# Patient Record
Sex: MALE | Race: BLACK OR AFRICAN AMERICAN | NOT HISPANIC OR LATINO | Employment: UNEMPLOYED | URBAN - METROPOLITAN AREA
[De-identification: names, ages, dates, MRNs, and addresses within clinical notes are randomized per-mention and may not be internally consistent; named-entity substitution may affect disease eponyms.]

---

## 2024-01-15 ENCOUNTER — OFFICE VISIT (OUTPATIENT)
Dept: URGENT CARE | Facility: URGENT CARE | Age: 34
End: 2024-01-15
Payer: COMMERCIAL

## 2024-01-15 VITALS
RESPIRATION RATE: 18 BRPM | HEART RATE: 71 BPM | SYSTOLIC BLOOD PRESSURE: 136 MMHG | WEIGHT: 187.39 LBS | DIASTOLIC BLOOD PRESSURE: 88 MMHG | TEMPERATURE: 98.3 F | OXYGEN SATURATION: 100 %

## 2024-01-15 DIAGNOSIS — M54.41 ACUTE RIGHT-SIDED LOW BACK PAIN WITH RIGHT-SIDED SCIATICA: Primary | ICD-10-CM

## 2024-01-15 PROCEDURE — 99203 OFFICE O/P NEW LOW 30 MIN: CPT | Performed by: FAMILY MEDICINE

## 2024-01-15 RX ORDER — NAPROXEN 500 MG/1
500 TABLET ORAL 2 TIMES DAILY WITH MEALS
Qty: 14 TABLET | Refills: 0 | Status: SHIPPED | OUTPATIENT
Start: 2024-01-15 | End: 2024-01-22

## 2024-01-15 RX ORDER — METHYLPREDNISOLONE 4 MG
TABLET, DOSE PACK ORAL
Qty: 21 TABLET | Refills: 0 | Status: SHIPPED | OUTPATIENT
Start: 2024-01-15

## 2024-01-15 RX ORDER — METHOCARBAMOL 750 MG/1
750 TABLET, FILM COATED ORAL 4 TIMES DAILY
Qty: 28 TABLET | Refills: 0 | Status: SHIPPED | OUTPATIENT
Start: 2024-01-15 | End: 2024-01-22

## 2024-01-15 NOTE — PROGRESS NOTES
SUBJECTIVEHPI: Zaria Solomon is a 33 year old male who presents for evaluation of back pain.  Symptoms began day(s) ago, have been onset gradual and are worse.  Pain is located in the low back right region, with radiation to radiates into the right leg.  Recent injury:none recalled by the patient  Personal hx of back pain is recurrent self limited episodes of low back pain in the past.  Pain is exacerbated by: bending and changing position.  Pain is relieved by: none.  Associated sx include: none.  Red flag symptoms: negative.    No past medical history on file.  No Known Allergies  Social History     Tobacco Use    Smoking status: Never    Smokeless tobacco: Never   Substance Use Topics    Alcohol use: Not Currently       ROS:CONSTITUTIONAL:NEGATIVE for fever, chills, change in weight  INTEGUMENTARY/SKIN: NEGATIVE for worrisome rashes, moles or lesions    OBJECTIVE:  /88   Pulse 71   Temp 98.3  F (36.8  C)   Resp 18   Wt 85 kg (187 lb 6.3 oz)   SpO2 100%   Back examination: Back symmetric, no curvature. ROM normal. No CVA tenderness., positive findings: limitation of motion - flexion: Moderate and extension: Moderate, paraspinal muscle spasm, tenderness to palpation.  Straight leg raise test: positive.  GENERAL APPEARANCE: healthy, alert and no distress  NEURO: Normal strength and tone with no weakness or sensory deficit noted, reflexes normal   SKIN: no suspicious lesions or rashes      ICD-10-CM    1. Acute right-sided low back pain with right-sided sciatica  M54.41 methylPREDNISolone (MEDROL DOSEPAK) 4 MG tablet therapy pack     methocarbamol (ROBAXIN) 750 MG tablet     naproxen (NAPROSYN) 500 MG tablet        Continue prn heat or ice application.    F/U PCP/IM/FP if persists, ED if worse

## 2024-10-23 ENCOUNTER — OFFICE VISIT (OUTPATIENT)
Dept: FAMILY MEDICINE | Facility: CLINIC | Age: 34
End: 2024-10-23
Payer: COMMERCIAL

## 2024-10-23 ENCOUNTER — ANCILLARY PROCEDURE (OUTPATIENT)
Dept: GENERAL RADIOLOGY | Facility: CLINIC | Age: 34
End: 2024-10-23
Attending: NURSE PRACTITIONER
Payer: COMMERCIAL

## 2024-10-23 VITALS
TEMPERATURE: 97.7 F | RESPIRATION RATE: 18 BRPM | SYSTOLIC BLOOD PRESSURE: 111 MMHG | HEIGHT: 68 IN | BODY MASS INDEX: 29.1 KG/M2 | HEART RATE: 69 BPM | OXYGEN SATURATION: 99 % | WEIGHT: 192 LBS | DIASTOLIC BLOOD PRESSURE: 73 MMHG

## 2024-10-23 DIAGNOSIS — M54.42 CHRONIC LEFT-SIDED LOW BACK PAIN WITH LEFT-SIDED SCIATICA: ICD-10-CM

## 2024-10-23 DIAGNOSIS — G89.29 CHRONIC LEFT-SIDED LOW BACK PAIN WITH LEFT-SIDED SCIATICA: Primary | ICD-10-CM

## 2024-10-23 DIAGNOSIS — M54.42 CHRONIC LEFT-SIDED LOW BACK PAIN WITH LEFT-SIDED SCIATICA: Primary | ICD-10-CM

## 2024-10-23 DIAGNOSIS — G89.29 CHRONIC LEFT-SIDED LOW BACK PAIN WITH LEFT-SIDED SCIATICA: ICD-10-CM

## 2024-10-23 PROCEDURE — 99214 OFFICE O/P EST MOD 30 MIN: CPT | Performed by: NURSE PRACTITIONER

## 2024-10-23 PROCEDURE — 72100 X-RAY EXAM L-S SPINE 2/3 VWS: CPT | Mod: TC | Performed by: RADIOLOGY

## 2024-10-23 RX ORDER — CELECOXIB 100 MG/1
100 CAPSULE ORAL 2 TIMES DAILY
Qty: 20 CAPSULE | Refills: 0 | Status: SHIPPED | OUTPATIENT
Start: 2024-10-23

## 2024-10-23 RX ORDER — CYCLOBENZAPRINE HCL 10 MG
10 TABLET ORAL 2 TIMES DAILY PRN
Qty: 21 TABLET | Refills: 0 | Status: SHIPPED | OUTPATIENT
Start: 2024-10-23

## 2024-10-23 ASSESSMENT — PAIN SCALES - GENERAL: PAINLEVEL_OUTOF10: SEVERE PAIN (6)

## 2024-10-23 NOTE — PROGRESS NOTES
"  Assessment & Plan     (M54.42,  G89.29) Chronic left-sided low back pain with left-sided sciatica  (primary encounter diagnosis)  Comment: The patient presents with  low back pain, likely musculoskeletal in nature, given the negative X-ray findings and absence of red flags  or prior history of back issues. The pain is exacerbated by certain movements, particularly bending and sitting, and has not responded to acetaminophen.  Plan: REVIEW OF HEALTH MAINTENANCE PROTOCOL ORDERS,         XR Lumbar Spine 2/3 Views, celecoxib (CELEBREX)        100 MG capsule, cyclobenzaprine (FLEXERIL) 10         MG tablet    - Advised the patient to avoid activities that exacerbate the pain, particularly prolonged sitting and bending.   -Encouraged frequent breaks during driving to stretch and change positions.  - Schedule a follow-up appointment in 4-6 weeks to assess the effectiveness of the treatment plan and make any necessary adjustments.  - If symptoms do not improve or worsen, consider further imaging or referral to a specialist.    The patient was informed about the treatment plan and agreed to proceed with the recommendations. He was encouraged to reach out if he experiences any new symptoms or if his pain worsens.        BMI  Estimated body mass index is 29.19 kg/m  as calculated from the following:    Height as of this encounter: 1.727 m (5' 8\").    Weight as of this encounter: 87.1 kg (192 lb).           Shirley Enciso is a 34 year old, presenting for the following health issues:  Follow Up      10/23/2024    11:10 AM   Additional Questions   Roomed by Nimo   Accompanied by cousin     History of Present Illness       Reason for visit:  Back pain   He is taking medications regularly.    Zaria Solomon is a 34-year-old male who presents with a 2-month history of low back pain. He reports that the pain is localized to the right side of his lower back and is characterized as sharp. The pain radiates into his right leg " "but does not cause any new numbness or weakness in the legs. The patient rates his current pain intensity as 6 out of 10. He denies any specific cause for the pain and has no prior history of back problems. The pain interferes with his ability to work as an [employer] . He has not seen any other healthcare providers for this issue and has not undergone any previous imaging studies, such as MRI or X-rays. The patient has tried acetaminophen (Tylenol) for pain relief, but it did not provide any benefit. He notes that the pain worsens with bending and sitting, and he has not identified any alleviating factors.        [id number]          Review of Systems  Constitutional, HEENT, cardiovascular, pulmonary, GI, , musculoskeletal, neuro, skin, endocrine and psych systems are negative, except as otherwise noted.      Objective    /73   Pulse 69   Temp 97.7  F (36.5  C) (Temporal)   Resp 18   Ht 1.727 m (5' 8\")   Wt 87.1 kg (192 lb)   SpO2 99%   BMI 29.19 kg/m    Body mass index is 29.19 kg/m .  Physical Exam  Constitutional:       Appearance: Normal appearance.   HENT:      Head: Normocephalic and atraumatic.      Nose: Nose normal.      Mouth/Throat:      Mouth: Mucous membranes are moist.   Cardiovascular:      Rate and Rhythm: Normal rate and regular rhythm.      Pulses: Normal pulses.      Heart sounds: Normal heart sounds. No murmur heard.  Pulmonary:      Effort: Pulmonary effort is normal.      Breath sounds: Normal breath sounds.   Abdominal:      General: Bowel sounds are normal.      Palpations: Abdomen is soft.   Musculoskeletal:      Cervical back: Normal range of motion and neck supple.      Lumbar back: Spasms, tenderness and bony tenderness present. No deformity or lacerations. Decreased range of motion. Positive left straight leg raise test. Negative right straight leg raise test. No scoliosis.   Lymphadenopathy:      Cervical: No cervical adenopathy.   Skin:     General: Skin is warm.      " Capillary Refill: Capillary refill takes less than 2 seconds.      Findings: No lesion or rash.   Neurological:      Mental Status: He is alert and oriented to person, place, and time.   Psychiatric:         Mood and Affect: Mood normal.         Behavior: Behavior normal.         Thought Content: Thought content normal.         Judgment: Judgment normal.                    Signed Electronically by: ELAINA Mas CNP